# Patient Record
Sex: MALE | Race: WHITE | NOT HISPANIC OR LATINO | Employment: FULL TIME | URBAN - METROPOLITAN AREA
[De-identification: names, ages, dates, MRNs, and addresses within clinical notes are randomized per-mention and may not be internally consistent; named-entity substitution may affect disease eponyms.]

---

## 2025-01-10 ENCOUNTER — APPOINTMENT (OUTPATIENT)
Dept: RADIOLOGY | Facility: HOSPITAL | Age: 25
End: 2025-01-10
Payer: COMMERCIAL

## 2025-01-10 ENCOUNTER — APPOINTMENT (OUTPATIENT)
Dept: CARDIOLOGY | Facility: HOSPITAL | Age: 25
End: 2025-01-10
Payer: COMMERCIAL

## 2025-01-10 ENCOUNTER — HOSPITAL ENCOUNTER (EMERGENCY)
Facility: HOSPITAL | Age: 25
Discharge: HOME | End: 2025-01-10
Attending: EMERGENCY MEDICINE
Payer: COMMERCIAL

## 2025-01-10 VITALS
WEIGHT: 190 LBS | OXYGEN SATURATION: 99 % | RESPIRATION RATE: 18 BRPM | DIASTOLIC BLOOD PRESSURE: 83 MMHG | BODY MASS INDEX: 26.6 KG/M2 | HEIGHT: 71 IN | SYSTOLIC BLOOD PRESSURE: 129 MMHG | TEMPERATURE: 97.7 F | HEART RATE: 95 BPM

## 2025-01-10 DIAGNOSIS — R17 ELEVATED BILIRUBIN: Primary | ICD-10-CM

## 2025-01-10 DIAGNOSIS — R07.9 CHEST PAIN, UNSPECIFIED TYPE: ICD-10-CM

## 2025-01-10 DIAGNOSIS — R10.11 ACUTE BILATERAL UPPER ABDOMINAL PAIN: ICD-10-CM

## 2025-01-10 DIAGNOSIS — R10.12 ACUTE BILATERAL UPPER ABDOMINAL PAIN: ICD-10-CM

## 2025-01-10 LAB
ALBUMIN SERPL BCP-MCNC: 5.1 G/DL (ref 3.4–5)
ALP SERPL-CCNC: 53 U/L (ref 33–120)
ALT SERPL W P-5'-P-CCNC: 15 U/L (ref 10–52)
ANION GAP SERPL CALC-SCNC: 16 MMOL/L (ref 10–20)
AST SERPL W P-5'-P-CCNC: 23 U/L (ref 9–39)
BASOPHILS # BLD AUTO: 0.03 X10*3/UL (ref 0–0.1)
BASOPHILS NFR BLD AUTO: 0.3 %
BILIRUB SERPL-MCNC: 3.2 MG/DL (ref 0–1.2)
BUN SERPL-MCNC: 11 MG/DL (ref 6–23)
CALCIUM SERPL-MCNC: 10 MG/DL (ref 8.6–10.3)
CARDIAC TROPONIN I PNL SERPL HS: <3 NG/L (ref 0–20)
CHLORIDE SERPL-SCNC: 100 MMOL/L (ref 98–107)
CO2 SERPL-SCNC: 27 MMOL/L (ref 21–32)
CREAT SERPL-MCNC: 0.87 MG/DL (ref 0.5–1.3)
EGFRCR SERPLBLD CKD-EPI 2021: >90 ML/MIN/1.73M*2
EOSINOPHIL # BLD AUTO: 0.09 X10*3/UL (ref 0–0.7)
EOSINOPHIL NFR BLD AUTO: 1 %
ERYTHROCYTE [DISTWIDTH] IN BLOOD BY AUTOMATED COUNT: 12 % (ref 11.5–14.5)
GLUCOSE SERPL-MCNC: 81 MG/DL (ref 74–99)
HCT VFR BLD AUTO: 45.1 % (ref 41–52)
HGB BLD-MCNC: 16.1 G/DL (ref 13.5–17.5)
IMM GRANULOCYTES # BLD AUTO: 0.02 X10*3/UL (ref 0–0.7)
IMM GRANULOCYTES NFR BLD AUTO: 0.2 % (ref 0–0.9)
LIPASE SERPL-CCNC: 15 U/L (ref 9–82)
LYMPHOCYTES # BLD AUTO: 1.99 X10*3/UL (ref 1.2–4.8)
LYMPHOCYTES NFR BLD AUTO: 23 %
MCH RBC QN AUTO: 31.6 PG (ref 26–34)
MCHC RBC AUTO-ENTMCNC: 35.7 G/DL (ref 32–36)
MCV RBC AUTO: 88 FL (ref 80–100)
MONOCYTES # BLD AUTO: 0.78 X10*3/UL (ref 0.1–1)
MONOCYTES NFR BLD AUTO: 9 %
NEUTROPHILS # BLD AUTO: 5.74 X10*3/UL (ref 1.2–7.7)
NEUTROPHILS NFR BLD AUTO: 66.5 %
NRBC BLD-RTO: 0 /100 WBCS (ref 0–0)
PLATELET # BLD AUTO: 304 X10*3/UL (ref 150–450)
POTASSIUM SERPL-SCNC: 3.3 MMOL/L (ref 3.5–5.3)
PROT SERPL-MCNC: 7.9 G/DL (ref 6.4–8.2)
RBC # BLD AUTO: 5.1 X10*6/UL (ref 4.5–5.9)
SODIUM SERPL-SCNC: 140 MMOL/L (ref 136–145)
WBC # BLD AUTO: 8.7 X10*3/UL (ref 4.4–11.3)

## 2025-01-10 PROCEDURE — 36415 COLL VENOUS BLD VENIPUNCTURE: CPT | Performed by: NURSE PRACTITIONER

## 2025-01-10 PROCEDURE — 71046 X-RAY EXAM CHEST 2 VIEWS: CPT

## 2025-01-10 PROCEDURE — 80053 COMPREHEN METABOLIC PANEL: CPT | Performed by: NURSE PRACTITIONER

## 2025-01-10 PROCEDURE — 71046 X-RAY EXAM CHEST 2 VIEWS: CPT | Performed by: SURGERY

## 2025-01-10 PROCEDURE — 99285 EMERGENCY DEPT VISIT HI MDM: CPT | Mod: 25 | Performed by: EMERGENCY MEDICINE

## 2025-01-10 PROCEDURE — 76705 ECHO EXAM OF ABDOMEN: CPT | Performed by: SURGERY

## 2025-01-10 PROCEDURE — 2500000002 HC RX 250 W HCPCS SELF ADMINISTERED DRUGS (ALT 637 FOR MEDICARE OP, ALT 636 FOR OP/ED)

## 2025-01-10 PROCEDURE — 76705 ECHO EXAM OF ABDOMEN: CPT

## 2025-01-10 PROCEDURE — 85025 COMPLETE CBC W/AUTO DIFF WBC: CPT | Performed by: NURSE PRACTITIONER

## 2025-01-10 PROCEDURE — 93005 ELECTROCARDIOGRAM TRACING: CPT

## 2025-01-10 PROCEDURE — 83690 ASSAY OF LIPASE: CPT | Performed by: NURSE PRACTITIONER

## 2025-01-10 PROCEDURE — 84484 ASSAY OF TROPONIN QUANT: CPT | Performed by: NURSE PRACTITIONER

## 2025-01-10 RX ORDER — POTASSIUM CHLORIDE 20 MEQ/1
40 TABLET, EXTENDED RELEASE ORAL ONCE
Status: COMPLETED | OUTPATIENT
Start: 2025-01-10 | End: 2025-01-10

## 2025-01-10 RX ADMIN — POTASSIUM CHLORIDE 40 MEQ: 1500 TABLET, EXTENDED RELEASE ORAL at 20:49

## 2025-01-10 ASSESSMENT — PAIN DESCRIPTION - LOCATION: LOCATION: CHEST

## 2025-01-10 ASSESSMENT — COLUMBIA-SUICIDE SEVERITY RATING SCALE - C-SSRS
1. IN THE PAST MONTH, HAVE YOU WISHED YOU WERE DEAD OR WISHED YOU COULD GO TO SLEEP AND NOT WAKE UP?: NO
6. HAVE YOU EVER DONE ANYTHING, STARTED TO DO ANYTHING, OR PREPARED TO DO ANYTHING TO END YOUR LIFE?: NO
2. HAVE YOU ACTUALLY HAD ANY THOUGHTS OF KILLING YOURSELF?: NO

## 2025-01-10 ASSESSMENT — LIFESTYLE VARIABLES
HAVE PEOPLE ANNOYED YOU BY CRITICIZING YOUR DRINKING: NO
EVER HAD A DRINK FIRST THING IN THE MORNING TO STEADY YOUR NERVES TO GET RID OF A HANGOVER: NO
TOTAL SCORE: 0
HAVE YOU EVER FELT YOU SHOULD CUT DOWN ON YOUR DRINKING: NO
EVER FELT BAD OR GUILTY ABOUT YOUR DRINKING: NO

## 2025-01-10 ASSESSMENT — PAIN DESCRIPTION - ORIENTATION: ORIENTATION: LEFT

## 2025-01-10 ASSESSMENT — PAIN DESCRIPTION - PAIN TYPE: TYPE: ACUTE PAIN

## 2025-01-10 ASSESSMENT — PAIN - FUNCTIONAL ASSESSMENT: PAIN_FUNCTIONAL_ASSESSMENT: 0-10

## 2025-01-10 ASSESSMENT — PAIN SCALES - GENERAL: PAINLEVEL_OUTOF10: 4

## 2025-01-10 NOTE — ED TRIAGE NOTES
TRIAGE NOTE   I saw the patient as the Clinician in Triage and performed a brief history and physical exam, established acuity, and ordered appropriate tests to develop basic plan of care. Patient will be seen by an [facility], resident and/or physician who will independently evaluate the patient. Please see subsequent provider notes for further details and disposition.     Brief HPI: In brief, Jonnie Jensen is a 24 y.o. male significant PMH for A-fib s/p ablation presenting to ED today from home by himself for evaluation of chest pain and abdominal pain.  A week ago the patient developed intermittent left-sided anterior chest pain as well as intermittent abdominal pain.  Currently no pain in the chest or abdomen.  No contributing or relieving factors.  Pain seems to be random.  Patient is concerned his eyes were yellow and there could be something wrong with his liver.  No history of PE/DVT, recent travel, recent surgery, history of malignancy or use of exogenous hormones.  Father had MI in his 50s.  Denies fever/chills, cough/cold symptoms, shortness of breath, nausea/vomiting, dysuria/hematuria, change in bowel habits or any other complaints.  Smokes marijuana, no EtOH or IV drugs.  PCP is Dr. Amador, cardiologist at [facility].    Focused Physical exam:   General: 24-year-old male, awake and alert, oriented x 3.  Well-nourished and hydrated.  Nontoxic looking.  Skin: Pink, warm dry  Cardiac: Regular rate and rhythm  Pulmonary: Clear bilaterally.  Abdomen: Flat and soft with bowel sounds, nontender.  No CVA tenderness.    Plan/MDM:   24-year-old male with history of A-fib s/p ablation is evaluated at the bedside for intermittent left-sided chest pain and abdominal pain over the last week.  Currently no pain.  Vital signs within normal limits.  Afebrile.  Percs out for PE.  IV established, will check basic labs including troponin, EKG and chest x-ray.  As the abdomen is not tender at this time, will review lab work  before ordering imaging of the abdomen.  Patient is agreeable to this plan.      Please see subsequent provider note for further details and disposition

## 2025-01-10 NOTE — ED TRIAGE NOTES
Pt states that he has been having intermittent chest pain on the left side of his chest for the last week. Has hx of afib. States that he has also been having abdominal pain for the last week as well. States that for the abdominal pain that it is middle of his abdomen on both sides. States that is like a 3 or 4 for pain. Having normal BM. Is nauseous in the mornings.

## 2025-01-11 NOTE — ED PROVIDER NOTES
HPI   Chief Complaint   Patient presents with    Abdominal Pain    Chest Pain     Pt states that he has been having intermittent chest pain on the left side of his chest for the last week. Has hx of afib. States that he has also been having abdominal pain for the last week as well. States that for the abdominal pain that it is middle of his abdomen on both sides. States that is like a 3 or 4 for pain. Having normal BM. Is nauseous in the mornings.          24 year-old male significant PMH for A-fib s/p ablation presenting to ED today for evaluation of chest pain and abdominal pain x 1 week.  Patient states he has had intermittent left-sided chest pain, describes it as sharp shooting lasting up to 2-3 minutes.  Denies the pain radiating anywhere, any worsening, or alleviating factors.  Patiently additionally reports intermittent upper abdominal pain.  States he noticed his eyes have been slightly yellow over the past week as well. Endorses nausea present in the mornings but denies any vomiting.  Denies any fevers, chills, HA, vision changes, cough/cold symptoms, SOB, palpitations, diarrhea, constipation, urinary symptoms.  States he takes a daily baby aspirin.  Reports using marijuana.  Denies cigarette smoking, vapes, alcohol, IV drug use.  Reports a prior EGD, denies any other prior abdominal surgeries.               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  No family history on file.  Social History     Tobacco Use    Smoking status: Not on file    Smokeless tobacco: Not on file   Substance Use Topics    Alcohol use: Not on file    Drug use: Not on file       Physical Exam   ED Triage Vitals   Temperature  Heart Rate Respirations BP   01/10/25 1839 01/10/25 1839 01/10/25 1839 01/10/25 1839   36.5 °C (97.7 °F) 71 16 130/72      Pulse Ox Temp Source Heart Rate Source Patient Position   01/10/25 1839 01/10/25 1839 01/10/25 1839 01/10/25 2014   100 % Tympanic Monitor Sitting      BP Location FiO2 (%)     01/10/25 2014 --     Right arm        Physical Exam  Constitutional:       General: He is not in acute distress.     Appearance: He is well-developed. He is not ill-appearing or toxic-appearing.   HENT:      Head: Normocephalic and atraumatic.   Eyes:      General: Scleral icterus present.      Extraocular Movements: Extraocular movements intact.      Pupils: Pupils are equal, round, and reactive to light.   Cardiovascular:      Rate and Rhythm: Normal rate and regular rhythm.      Heart sounds: Normal heart sounds.   Pulmonary:      Effort: Pulmonary effort is normal. No respiratory distress.      Breath sounds: Normal breath sounds.   Chest:      Chest wall: No tenderness.   Abdominal:      General: Abdomen is flat and protuberant. Bowel sounds are normal. There is no distension. There are no signs of injury.      Palpations: Abdomen is soft. There is no hepatomegaly or splenomegaly.      Tenderness: There is no abdominal tenderness. There is no right CVA tenderness, left CVA tenderness, guarding or rebound.   Skin:     General: Skin is warm and dry.   Neurological:      General: No focal deficit present.      Mental Status: He is alert.           ED Course & MDM   Diagnoses as of 01/10/25 2220   Chest pain, unspecified type   Acute bilateral upper abdominal pain   Elevated bilirubin                 No data recorded     [place] Coma Scale Score: 15 (01/10/25 2018 : Kayley Davis, TERESA)                           Medical Decision Making  24 year-old male significant PMH for A-fib s/p ablation presenting to ED today for evaluation of chest pain and abdominal pain x 1 week.  On arrival, patient is afebrile, without  tachycardia, SpO2 99% on RA.  Initial workup ordered in triage.  On exam, patient states he is currently asymptomatic.  Reports an episode of chest pain while in the waiting room that is since resolved.  No reproducible chest tenderness or abdominal tenderness on palpation.  Slight scleral icterus present.  EKG showing normal sinus rhythm, specific T wave changes similar to prior study, rate 85, , , QTc 449.  No acute axis deviation, STEMI, or ischemia.  High sensitivity troponin negative.  CBC unremarkable, no leukocytosis or signs of anemia.  CMP showing slight hypokalemia 3.3 and elevated bilirubin of 3.2.  A RUQ US was added to the initial workup.  Lipase WNL. CXR negative. Potassium was replaced. RUQ US showing 2 incidental small hyperechoic hepatic masses that are likely incidental benign cavernous hemangiomata.  No obstructive processes identified. Patient was informed of all his lab and imaging findings, all questions and concerns were answered.  Patient was given a GI referral to follow-up regarding the elevated bilirubin and incidental findings on the ultrasound.  Also discussed decreasing marijuana use to assess for improvement in persistent nausea. Patient was agreeable to plan of care and discharge with GI follow-up. Dr. Wang, a gastroenterologist, contact information was provided on discharge papers as well as all his lab findings. Strict return precautions were given to return to the ED with any new or worsening symptoms.        Procedure  Procedures     Nathalie Barrera PA-C  01/10/25 2222       Nathalie Barrera PA-C  01/10/25 222

## 2025-01-11 NOTE — DISCHARGE INSTRUCTIONS
Dr. Wang's contact information was provided for a GI follow-up if you cannot get in with your establish GI provider. Return to the ED with any new or worsening symptoms.

## 2025-01-13 LAB
ATRIAL RATE: 85 BPM
P AXIS: 88 DEGREES
P OFFSET: 204 MS
P ONSET: 145 MS
PR INTERVAL: 152 MS
Q ONSET: 221 MS
QRS COUNT: 14 BEATS
QRS DURATION: 104 MS
QT INTERVAL: 378 MS
QTC CALCULATION(BAZETT): 449 MS
QTC FREDERICIA: 424 MS
R AXIS: 87 DEGREES
T AXIS: 70 DEGREES
T OFFSET: 410 MS
VENTRICULAR RATE: 85 BPM

## 2025-02-18 ENCOUNTER — APPOINTMENT (OUTPATIENT)
Dept: GASTROENTEROLOGY | Facility: CLINIC | Age: 25
End: 2025-02-18
Payer: COMMERCIAL

## 2025-02-18 VITALS
BODY MASS INDEX: 26.18 KG/M2 | WEIGHT: 187 LBS | SYSTOLIC BLOOD PRESSURE: 110 MMHG | HEIGHT: 71 IN | HEART RATE: 91 BPM | DIASTOLIC BLOOD PRESSURE: 61 MMHG

## 2025-02-18 DIAGNOSIS — E80.6 BILIRUBINEMIA: ICD-10-CM

## 2025-02-18 DIAGNOSIS — R10.13 EPIGASTRIC PAIN SYNDROME: Primary | ICD-10-CM

## 2025-02-18 PROCEDURE — 3008F BODY MASS INDEX DOCD: CPT | Performed by: INTERNAL MEDICINE

## 2025-02-18 PROCEDURE — 99203 OFFICE O/P NEW LOW 30 MIN: CPT | Performed by: INTERNAL MEDICINE

## 2025-02-18 RX ORDER — PROPAFENONE HYDROCHLORIDE 225 MG/1
225 CAPSULE, EXTENDED RELEASE ORAL 2 TIMES DAILY
COMMUNITY

## 2025-02-18 RX ORDER — METOPROLOL TARTRATE 25 MG/1
12.5 TABLET, FILM COATED ORAL 2 TIMES DAILY
COMMUNITY

## 2025-02-18 NOTE — PROGRESS NOTES
This 24-year-old gentleman was in the emergency room because of abdominal pain.  It was mostly confined to the upper abdomen.  There was no fever or chills the patient did notice that his skin color was slightly yellow which she has noticed for the last few months.  An ultrasound of the abdomen was obtained which showed to hepatic cysts which are thought to be hemangiomas no evidence of gallstone biliary dilatation no parenchymal liver disease was noted.  He was sent here for an evaluation.    The patient was found to have atrial fibrillation about 3 years ago he required ablation therapy.  He took anticoagulation for a brief period of time currently he is not taking any anticoagulation  He was found to be in normal sinus rhythm on EKG at the emergency room.    Past medical and surgical history apart from April atrial fibrillation he has been in good health.    Family history his father has fatty liver mother apparently is in good health.  One of his uncles had hepatitis.    Personal history he smokes marijuana on a daily basis.  Does not consume alcoholic beverages or any other chemicals.    A 10 point review of system is obtained which is positive for history of atrial fibrillation ablation therapy no prior history of heart disease murmurs valvular heart disease alcohol abuse thyroid disease appetite and weight is good otherwise he has no desire local out of his eyes but not of the urine.  No urinary symptoms otherwise negative.    On physical examination healthy-appearing gentleman who appears slightly icteric HEENT is otherwise unremarkable oral cavity well-hydrated neck is supple no adenopathy thyroid is not enlarged.  JVD is flat.    Heart sounds were normal careful examination does not reveal any evidence of cardiomegaly or murmurs.  Appears to be regular sinus rhythm.  Chest is clear to auscultation and percussion.    Examination abdomen is completely negative no hepatomegaly splenomegaly ascites signs of  collateral circulation free fluid in the abdomen hernias or masses no peripheral edema mental status neuro normal.    General Examination reveals a healthy-appearing gentleman who is slightly icteric without cyanosis clubbing jaundice lymphadenopathy and clinically euthyroid.  Clinical impression this patient has asymptomatic hyperbilirubinemia which most likely is in the absence of any liver disease or hemolysis Gilbert's syndrome.  We will request direct and indirect bilirubin as well as a reticulocyte count and LDH.  He would also require an upper GI endoscopic examination to evaluate his abdominal pain.    The    The patient has history of atrial fibrillation for which she had ablation therapy and currently in sinus rhythm he is not take any systemic anticoagulation etiology of his atrial fibrillation remains unclear me he does not appear to have any valvular heart disease or congenital heart disease his echocardiogram was normal.

## 2025-03-05 ENCOUNTER — ANESTHESIA (OUTPATIENT)
Dept: GASTROENTEROLOGY | Facility: HOSPITAL | Age: 25
End: 2025-03-05
Payer: COMMERCIAL

## 2025-03-05 ENCOUNTER — HOSPITAL ENCOUNTER (OUTPATIENT)
Dept: GASTROENTEROLOGY | Facility: HOSPITAL | Age: 25
Discharge: HOME | End: 2025-03-05
Payer: COMMERCIAL

## 2025-03-05 ENCOUNTER — ANESTHESIA EVENT (OUTPATIENT)
Dept: GASTROENTEROLOGY | Facility: HOSPITAL | Age: 25
End: 2025-03-05
Payer: COMMERCIAL

## 2025-03-05 VITALS
BODY MASS INDEX: 26.18 KG/M2 | DIASTOLIC BLOOD PRESSURE: 70 MMHG | HEIGHT: 71 IN | WEIGHT: 187 LBS | RESPIRATION RATE: 16 BRPM | TEMPERATURE: 96.8 F | SYSTOLIC BLOOD PRESSURE: 126 MMHG | OXYGEN SATURATION: 100 % | HEART RATE: 52 BPM

## 2025-03-05 DIAGNOSIS — R10.13 EPIGASTRIC PAIN SYNDROME: ICD-10-CM

## 2025-03-05 PROCEDURE — A43239 PR EDG TRANSORAL BIOPSY SINGLE/MULTIPLE: Performed by: NURSE ANESTHETIST, CERTIFIED REGISTERED

## 2025-03-05 PROCEDURE — 3700000002 HC GENERAL ANESTHESIA TIME - EACH INCREMENTAL 1 MINUTE

## 2025-03-05 PROCEDURE — 3700000001 HC GENERAL ANESTHESIA TIME - INITIAL BASE CHARGE

## 2025-03-05 PROCEDURE — 43239 EGD BIOPSY SINGLE/MULTIPLE: CPT | Performed by: INTERNAL MEDICINE

## 2025-03-05 PROCEDURE — A43239 PR EDG TRANSORAL BIOPSY SINGLE/MULTIPLE: Performed by: ANESTHESIOLOGY

## 2025-03-05 PROCEDURE — 7100000010 HC PHASE TWO TIME - EACH INCREMENTAL 1 MINUTE

## 2025-03-05 PROCEDURE — 2500000004 HC RX 250 GENERAL PHARMACY W/ HCPCS (ALT 636 FOR OP/ED): Performed by: NURSE ANESTHETIST, CERTIFIED REGISTERED

## 2025-03-05 PROCEDURE — 7100000009 HC PHASE TWO TIME - INITIAL BASE CHARGE

## 2025-03-05 RX ORDER — LIDOCAINE HCL/PF 100 MG/5ML
SYRINGE (ML) INTRAVENOUS AS NEEDED
Status: DISCONTINUED | OUTPATIENT
Start: 2025-03-05 | End: 2025-03-05

## 2025-03-05 RX ORDER — PROPOFOL 10 MG/ML
INJECTION, EMULSION INTRAVENOUS AS NEEDED
Status: DISCONTINUED | OUTPATIENT
Start: 2025-03-05 | End: 2025-03-05

## 2025-03-05 RX ADMIN — PROPOFOL 100 MG: 10 INJECTION, EMULSION INTRAVENOUS at 11:29

## 2025-03-05 RX ADMIN — PROPOFOL 140 MCG/KG/MIN: 10 INJECTION, EMULSION INTRAVENOUS at 11:30

## 2025-03-05 RX ADMIN — LIDOCAINE HYDROCHLORIDE 80 MG: 20 INJECTION, SOLUTION INTRAVENOUS at 11:29

## 2025-03-05 SDOH — HEALTH STABILITY: MENTAL HEALTH: CURRENT SMOKER: 0

## 2025-03-05 ASSESSMENT — PAIN SCALES - GENERAL
PAINLEVEL_OUTOF10: 0 - NO PAIN
PAIN_LEVEL: 0

## 2025-03-05 ASSESSMENT — COLUMBIA-SUICIDE SEVERITY RATING SCALE - C-SSRS
2. HAVE YOU ACTUALLY HAD ANY THOUGHTS OF KILLING YOURSELF?: NO
6. HAVE YOU EVER DONE ANYTHING, STARTED TO DO ANYTHING, OR PREPARED TO DO ANYTHING TO END YOUR LIFE?: NO
1. IN THE PAST MONTH, HAVE YOU WISHED YOU WERE DEAD OR WISHED YOU COULD GO TO SLEEP AND NOT WAKE UP?: NO

## 2025-03-05 ASSESSMENT — PAIN - FUNCTIONAL ASSESSMENT
PAIN_FUNCTIONAL_ASSESSMENT: 0-10

## 2025-03-05 NOTE — ANESTHESIA PREPROCEDURE EVALUATION
Patient: Jonnie Jensen    Procedure Information       Date/Time: 03/05/25 1200    Scheduled providers: Mariam Sinclair MD; Joe Berman MD    Procedure: EGD    Location: [facility]            Relevant Problems   Anesthesia   (-) Difficult intubation   (-) PONV (postoperative nausea and vomiting)       Clinical information reviewed:    Allergies  Meds               NPO Detail:  NPO/Void Status  Date of Last Liquid: 03/05/25  Time of Last Liquid: 0000  Date of Last Solid: 03/05/25  Time of Last Solid: 0000         Physical Exam    Airway  Mallampati: I  TM distance: >3 FB  Neck ROM: full     Cardiovascular - normal exam  Rhythm: regular  Rate: normal     Dental    Pulmonary - normal exam     Abdominal            Anesthesia Plan    History of general anesthesia?: yes  History of complications of general anesthesia?: no    ASA 1     MAC     The patient is not a current smoker.  Education provided regarding risk of obstructive sleep apnea.

## 2025-03-05 NOTE — ANESTHESIA POSTPROCEDURE EVALUATION
Patient: Jonnie Jensen    Procedure Summary       Date: 03/05/25 Room / Location: [facility]    Anesthesia Start: 1125 Anesthesia Stop:     Procedure: EGD Diagnosis: Epigastric pain syndrome    Scheduled Providers: Mariam Sinclair MD; Joe Berman MD Responsible Provider: Joe Berman MD    Anesthesia Type: MAC ASA Status: 1            Anesthesia Type: MAC    Vitals Value Taken Time   /56 03/05/25 1143   Temp 36 °C (96.8 °F) 03/05/25 1143   Pulse 52 03/05/25 1143   Resp 16 03/05/25 1143   SpO2 97 % 03/05/25 1143       Anesthesia Post Evaluation    Patient location during evaluation: PACU  Patient participation: complete - patient participated  Level of consciousness: sleepy but conscious  Pain score: 0  Pain management: adequate  Airway patency: patent  Cardiovascular status: acceptable, blood pressure returned to baseline and hemodynamically stable  Respiratory status: acceptable  Hydration status: acceptable  Postoperative Nausea and Vomiting: none        There were no known notable events for this encounter.

## 2025-03-13 LAB
BILIRUB DIRECT SERPL-MCNC: 0.4 MG/DL
BILIRUB SERPL-MCNC: 2.5 MG/DL (ref 0.2–1.2)
LABORATORY COMMENT REPORT: NORMAL
PATH REPORT.FINAL DX SPEC: NORMAL
PATH REPORT.GROSS SPEC: NORMAL
PATH REPORT.RELEVANT HX SPEC: NORMAL
PATH REPORT.TOTAL CANCER: NORMAL
RETICS #: NORMAL CELLS/UL (ref 25000–90000)
RETICS/RBC NFR AUTO: 1.2 %

## 2025-03-15 ENCOUNTER — HOSPITAL ENCOUNTER (OUTPATIENT)
Dept: CARDIOLOGY | Facility: HOSPITAL | Age: 25
Discharge: HOME | End: 2025-03-15
Payer: COMMERCIAL

## 2025-03-15 ENCOUNTER — APPOINTMENT (OUTPATIENT)
Dept: RADIOLOGY | Facility: HOSPITAL | Age: 25
End: 2025-03-15
Payer: COMMERCIAL

## 2025-03-15 ENCOUNTER — HOSPITAL ENCOUNTER (EMERGENCY)
Facility: HOSPITAL | Age: 25
Discharge: HOME | End: 2025-03-15
Attending: STUDENT IN AN ORGANIZED HEALTH CARE EDUCATION/TRAINING PROGRAM
Payer: COMMERCIAL

## 2025-03-15 ENCOUNTER — APPOINTMENT (OUTPATIENT)
Dept: CARDIOLOGY | Facility: HOSPITAL | Age: 25
End: 2025-03-15
Payer: COMMERCIAL

## 2025-03-15 VITALS
HEART RATE: 60 BPM | HEIGHT: 71 IN | SYSTOLIC BLOOD PRESSURE: 118 MMHG | TEMPERATURE: 97.3 F | DIASTOLIC BLOOD PRESSURE: 67 MMHG | WEIGHT: 175 LBS | OXYGEN SATURATION: 95 % | BODY MASS INDEX: 24.5 KG/M2 | RESPIRATION RATE: 18 BRPM

## 2025-03-15 DIAGNOSIS — F12.90: ICD-10-CM

## 2025-03-15 DIAGNOSIS — R55 SYNCOPE AND COLLAPSE: Primary | ICD-10-CM

## 2025-03-15 DIAGNOSIS — R11.2 NAUSEA AND VOMITING, UNSPECIFIED VOMITING TYPE: ICD-10-CM

## 2025-03-15 LAB
ALBUMIN SERPL BCP-MCNC: 4.6 G/DL (ref 3.4–5)
ALP SERPL-CCNC: 46 U/L (ref 33–120)
ALT SERPL W P-5'-P-CCNC: 9 U/L (ref 10–52)
ANION GAP SERPL CALC-SCNC: 14 MMOL/L (ref 10–20)
AST SERPL W P-5'-P-CCNC: 14 U/L (ref 9–39)
BASOPHILS # BLD AUTO: 0.03 X10*3/UL (ref 0–0.1)
BASOPHILS NFR BLD AUTO: 0.4 %
BILIRUB SERPL-MCNC: 2.8 MG/DL (ref 0–1.2)
BUN SERPL-MCNC: 13 MG/DL (ref 6–23)
CALCIUM SERPL-MCNC: 9.5 MG/DL (ref 8.6–10.3)
CARDIAC TROPONIN I PNL SERPL HS: <3 NG/L (ref 0–20)
CARDIAC TROPONIN I PNL SERPL HS: <3 NG/L (ref 0–20)
CHLORIDE SERPL-SCNC: 104 MMOL/L (ref 98–107)
CO2 SERPL-SCNC: 25 MMOL/L (ref 21–32)
CREAT SERPL-MCNC: 0.87 MG/DL (ref 0.5–1.3)
D DIMER PPP FEU-MCNC: 638 NG/ML FEU
EGFRCR SERPLBLD CKD-EPI 2021: >90 ML/MIN/1.73M*2
EOSINOPHIL # BLD AUTO: 0.17 X10*3/UL (ref 0–0.7)
EOSINOPHIL NFR BLD AUTO: 2.2 %
ERYTHROCYTE [DISTWIDTH] IN BLOOD BY AUTOMATED COUNT: 12 % (ref 11.5–14.5)
GLUCOSE BLD MANUAL STRIP-MCNC: 110 MG/DL (ref 74–99)
GLUCOSE SERPL-MCNC: 102 MG/DL (ref 74–99)
HCT VFR BLD AUTO: 44.5 % (ref 41–52)
HGB BLD-MCNC: 15.6 G/DL (ref 13.5–17.5)
IMM GRANULOCYTES # BLD AUTO: 0.03 X10*3/UL (ref 0–0.7)
IMM GRANULOCYTES NFR BLD AUTO: 0.4 % (ref 0–0.9)
LYMPHOCYTES # BLD AUTO: 1.96 X10*3/UL (ref 1.2–4.8)
LYMPHOCYTES NFR BLD AUTO: 25.9 %
MAGNESIUM SERPL-MCNC: 1.82 MG/DL (ref 1.6–2.4)
MCH RBC QN AUTO: 30.6 PG (ref 26–34)
MCHC RBC AUTO-ENTMCNC: 35.1 G/DL (ref 32–36)
MCV RBC AUTO: 87 FL (ref 80–100)
MONOCYTES # BLD AUTO: 0.75 X10*3/UL (ref 0.1–1)
MONOCYTES NFR BLD AUTO: 9.9 %
NEUTROPHILS # BLD AUTO: 4.62 X10*3/UL (ref 1.2–7.7)
NEUTROPHILS NFR BLD AUTO: 61.2 %
NRBC BLD-RTO: 0 /100 WBCS (ref 0–0)
PLATELET # BLD AUTO: 287 X10*3/UL (ref 150–450)
POTASSIUM SERPL-SCNC: 3.7 MMOL/L (ref 3.5–5.3)
PROT SERPL-MCNC: 7.2 G/DL (ref 6.4–8.2)
RBC # BLD AUTO: 5.09 X10*6/UL (ref 4.5–5.9)
SODIUM SERPL-SCNC: 139 MMOL/L (ref 136–145)
WBC # BLD AUTO: 7.6 X10*3/UL (ref 4.4–11.3)

## 2025-03-15 PROCEDURE — 85025 COMPLETE CBC W/AUTO DIFF WBC: CPT | Performed by: STUDENT IN AN ORGANIZED HEALTH CARE EDUCATION/TRAINING PROGRAM

## 2025-03-15 PROCEDURE — 84484 ASSAY OF TROPONIN QUANT: CPT | Performed by: STUDENT IN AN ORGANIZED HEALTH CARE EDUCATION/TRAINING PROGRAM

## 2025-03-15 PROCEDURE — 36415 COLL VENOUS BLD VENIPUNCTURE: CPT | Performed by: STUDENT IN AN ORGANIZED HEALTH CARE EDUCATION/TRAINING PROGRAM

## 2025-03-15 PROCEDURE — 93005 ELECTROCARDIOGRAM TRACING: CPT

## 2025-03-15 PROCEDURE — 85379 FIBRIN DEGRADATION QUANT: CPT | Performed by: STUDENT IN AN ORGANIZED HEALTH CARE EDUCATION/TRAINING PROGRAM

## 2025-03-15 PROCEDURE — 96361 HYDRATE IV INFUSION ADD-ON: CPT

## 2025-03-15 PROCEDURE — 71045 X-RAY EXAM CHEST 1 VIEW: CPT | Performed by: RADIOLOGY

## 2025-03-15 PROCEDURE — 71045 X-RAY EXAM CHEST 1 VIEW: CPT

## 2025-03-15 PROCEDURE — 83735 ASSAY OF MAGNESIUM: CPT | Performed by: STUDENT IN AN ORGANIZED HEALTH CARE EDUCATION/TRAINING PROGRAM

## 2025-03-15 PROCEDURE — 96374 THER/PROPH/DIAG INJ IV PUSH: CPT | Mod: 59

## 2025-03-15 PROCEDURE — 71275 CT ANGIOGRAPHY CHEST: CPT

## 2025-03-15 PROCEDURE — 71275 CT ANGIOGRAPHY CHEST: CPT | Performed by: RADIOLOGY

## 2025-03-15 PROCEDURE — 99285 EMERGENCY DEPT VISIT HI MDM: CPT | Mod: 25 | Performed by: STUDENT IN AN ORGANIZED HEALTH CARE EDUCATION/TRAINING PROGRAM

## 2025-03-15 PROCEDURE — 2550000001 HC RX 255 CONTRASTS: Performed by: STUDENT IN AN ORGANIZED HEALTH CARE EDUCATION/TRAINING PROGRAM

## 2025-03-15 PROCEDURE — 82947 ASSAY GLUCOSE BLOOD QUANT: CPT | Mod: 59

## 2025-03-15 PROCEDURE — 2500000004 HC RX 250 GENERAL PHARMACY W/ HCPCS (ALT 636 FOR OP/ED): Performed by: STUDENT IN AN ORGANIZED HEALTH CARE EDUCATION/TRAINING PROGRAM

## 2025-03-15 PROCEDURE — 80053 COMPREHEN METABOLIC PANEL: CPT | Performed by: STUDENT IN AN ORGANIZED HEALTH CARE EDUCATION/TRAINING PROGRAM

## 2025-03-15 RX ORDER — ONDANSETRON 4 MG/1
4 TABLET, ORALLY DISINTEGRATING ORAL EVERY 8 HOURS PRN
Qty: 20 TABLET | Refills: 0 | Status: SHIPPED | OUTPATIENT
Start: 2025-03-15 | End: 2025-03-22

## 2025-03-15 RX ORDER — ONDANSETRON HYDROCHLORIDE 2 MG/ML
4 INJECTION, SOLUTION INTRAVENOUS ONCE
Status: COMPLETED | OUTPATIENT
Start: 2025-03-15 | End: 2025-03-15

## 2025-03-15 RX ADMIN — ONDANSETRON 4 MG: 2 INJECTION INTRAMUSCULAR; INTRAVENOUS at 16:34

## 2025-03-15 RX ADMIN — IOHEXOL 80 ML: 350 INJECTION, SOLUTION INTRAVENOUS at 17:30

## 2025-03-15 RX ADMIN — SODIUM CHLORIDE 1000 ML: 9 INJECTION, SOLUTION INTRAVENOUS at 16:34

## 2025-03-15 NOTE — ED TRIAGE NOTES
Pt BIBA after having a syncopal episode. States that he was sweating, nauseous throwing up and feeling dizzy. Also complaining of lightheaded sensation. Did smoke marijuana. Is nauseous in triage.

## 2025-03-15 NOTE — ED PROVIDER NOTES
HPI   Chief Complaint   Patient presents with    Syncope     Pt BIBA after having a syncopal episode. States that he was sweating, nauseous throwing up and feeling dizzy. Also complaining of lightheaded sensation. Did smoke marijuana. Is nauseous in triage.          24-year-old male with past medical history of A-fib came in via EMS due to syncope episode while at home.  After the syncope episode he started spearing seeing nausea, vomiting, lightheadedness and sweating.  Patient uses marijuana but denies any similar symptoms in the past related to the substance use.  Patient denies any fever, chest pain, palpitations, diarrhea, sick contacts, recent traveling, leg swelling/pain, recent immobilization/surgeries, cough, congestion, DVT/PE history but there is family history of PE on his father.  Patient denies any head trauma because he is father was near him so he caught him before hitting the ground.  Patient is not on anticoagulation.  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  No family history on file.  Social History     Tobacco Use    Smoking status: Never    Smokeless tobacco: Never   Vaping Use    Vaping status: Never Used   Substance Use Topics    Alcohol use: Never    Drug use: Yes     Types: Marijuana       Physical Exam   ED Triage Vitals [03/15/25 1533]   Temperature Heart Rate Respirations BP   36.3 °C (97.3 °F) 64 18 118/67      Pulse Ox Temp Source Heart Rate Source Patient Position   100 % Tympanic Monitor Sitting      BP Location FiO2 (%)     Right arm --       Physical Exam  Vitals and nursing note  reviewed.   Constitutional:       General: He is not in acute distress.     Appearance: Normal appearance. He is not toxic-appearing.   HENT:      Head: Atraumatic.      Nose: Nose normal.      Mouth/Throat:      Mouth: Mucous membranes are moist.   Eyes:      Extraocular Movements: Extraocular movements intact.      Conjunctiva/sclera: Conjunctivae normal.      Pupils: Pupils are equal, round, and reactive to light.   Cardiovascular:      Rate and Rhythm: Regular rhythm. Bradycardia present.      Pulses: Normal pulses.      Heart sounds: Normal heart sounds.   Pulmonary:      Effort: Pulmonary effort is normal. No respiratory distress.      Breath sounds: Normal breath sounds. No stridor. No wheezing.   Abdominal:      General: Abdomen is flat. Bowel sounds are normal.      Palpations: Abdomen is soft.   Musculoskeletal:         General: No swelling or deformity. Normal range of motion.      Cervical back: Normal range of motion and neck supple. No rigidity.   Skin:     General: Skin is warm.      Capillary Refill: Capillary refill takes less than 2 seconds.   Neurological:      General: No focal deficit present.      Mental Status: He is alert and oriented to person, place, and time. Mental status is at baseline.      Cranial Nerves: No cranial nerve deficit.      Sensory: No sensory deficit.      Motor: No weakness.      Coordination: Coordination normal.      Gait: Gait normal.   Psychiatric:         Mood and Affect: Mood normal.         Behavior: Behavior normal.         Thought Content: Thought content normal.           ED Course & MDM   ED Course as of 03/15/25 1930   Sat Mar 15, 2025   1640 EKG completed.  Ventricular rate 56.  Sinus bradycardia.  No QRS widening no STEMI [AM]   1704 Troponin I, High Sensitivity: <3  Negative troponin [AM]   1813 Repeated trop is negative. CTA is pending [AM]   1858 Patient was reevaluated at bedside.  He is currently neurologically intact and states he is asymptomatic.   "Father is at bedside now.  Patient has no chest pain no abdominal pain no palpitations no lightheadedness no dizziness no near syncope symptoms.  Patient states \"I am feeling much better\".  Results regarding CBC CMP.,  Negative troponins x 2 CTA of the chest EKGs were reviewed and discussed with him and father at bedside.  Patient does have a cardiologist and he would like to be discharged and have outpatient follow-up.  All of his questions were answered.  Strict return precautions were provided. [AM]   1929 EKG with ventricular rate of 60.  Normal sinus rhythm normal EKG.  No axis deviation.  No trace widening.  No STEMI [AM]   1930 Patient is feeling well patient agree with discharge at this point. [AM]      ED Course User Index  [AM] Sigrid Esquivel MD         Diagnoses as of 03/15/25 1930   Syncope and collapse   Nausea and vomiting, unspecified vomiting type   Mariana user                 No data recorded     [place] Coma Scale Score: 15 (03/15/25 1538 : Jonnie Becker RN)                           Medical Decision Making      Procedure  Procedures     Sigrid Esquivel MD  03/15/25 1930    "

## 2025-03-17 ENCOUNTER — TELEPHONE (OUTPATIENT)
Dept: GASTROENTEROLOGY | Facility: HOSPITAL | Age: 25
End: 2025-03-17
Payer: COMMERCIAL

## 2025-03-17 NOTE — TELEPHONE ENCOUNTER
Gastric biopsies shows chronic gastritis and focal intestinal metaplasia patient to follow-up in the office I left message with the patient and noticed also he had ER visits recently.

## 2025-03-17 NOTE — TELEPHONE ENCOUNTER
Lab work reviewed predominantly direct hyperbilirubinemia most likely Gilbert's syndrome left message pathology reviewed to

## 2025-03-19 LAB
ATRIAL RATE: 56 BPM
ATRIAL RATE: 60 BPM
P AXIS: 66 DEGREES
P AXIS: 77 DEGREES
P OFFSET: 190 MS
P ONSET: 133 MS
PR INTERVAL: 168 MS
PR INTERVAL: 188 MS
Q ONSET: 217 MS
Q ONSET: 251 MS
QRS COUNT: 10 BEATS
QRS COUNT: 9 BEATS
QRS DURATION: 100 MS
QRS DURATION: 106 MS
QT INTERVAL: 418 MS
QT INTERVAL: 433 MS
QTC CALCULATION(BAZETT): 418 MS
QTC CALCULATION(BAZETT): 418 MS
QTC FREDERICIA: 418 MS
QTC FREDERICIA: 423 MS
R AXIS: 67 DEGREES
R AXIS: 81 DEGREES
T AXIS: 58 DEGREES
T AXIS: 76 DEGREES
T OFFSET: 426 MS
T OFFSET: 468 MS
VENTRICULAR RATE: 56 BPM
VENTRICULAR RATE: 60 BPM

## 2025-05-05 ENCOUNTER — APPOINTMENT (OUTPATIENT)
Dept: GASTROENTEROLOGY | Facility: CLINIC | Age: 25
End: 2025-05-05
Payer: COMMERCIAL

## 2025-05-05 VITALS
SYSTOLIC BLOOD PRESSURE: 121 MMHG | HEIGHT: 71 IN | BODY MASS INDEX: 25.81 KG/M2 | WEIGHT: 184.4 LBS | DIASTOLIC BLOOD PRESSURE: 73 MMHG | HEART RATE: 67 BPM

## 2025-05-05 DIAGNOSIS — K29.50 CHRONIC GASTRITIS WITHOUT BLEEDING, UNSPECIFIED GASTRITIS TYPE: ICD-10-CM

## 2025-05-05 DIAGNOSIS — E80.4 GILBERT'S SYNDROME: Primary | ICD-10-CM

## 2025-05-05 PROCEDURE — 1036F TOBACCO NON-USER: CPT | Performed by: INTERNAL MEDICINE

## 2025-05-05 PROCEDURE — 99213 OFFICE O/P EST LOW 20 MIN: CPT | Performed by: INTERNAL MEDICINE

## 2025-05-05 PROCEDURE — 3008F BODY MASS INDEX DOCD: CPT | Performed by: INTERNAL MEDICINE

## 2025-05-05 NOTE — PATIENT INSTRUCTIONS
As you remember your endoscopy and biopsy showed evidence of chronic gastritis.  I like to see you for a follow-up only in 1 year.  You may need repeat endoscopy in 2 to 3 years time

## 2025-05-05 NOTE — PROGRESS NOTES
This man was having abdominal pain and went to the emergency room was found to have a little elevated bilirubin.  On further evaluation this was thought to be from Gilbert's indirect hyperbilirubinemia.  He also having epigastric pain underwent upper endoscopy which showed evidence of gastritis and biopsies showed chronic intestinal metaplasia biopsies were negative for celiac sprue.    The patient is completely asymptomatic at this time he has normal bowel movements no nausea vomiting or abdominal pain.  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  He does smoke marijuana and does not smoke cigarettes or drink alcoholic beverages.    Family history personal history otherwise unchanged review of system otherwise completely unremarkable.  X 10    Physical examination shows he is not icteric today.  HEENT unremarkable no adenopathy oral cavity is normal neck is supple no nodes or enlarged were palpable.  Heart sounds were normal chest is clear abdominal exam is completely unremarkable no peripheral edema.    Clinical impression Gilbert's syndrome and was #2 chronic gastritis and intestinal metaplasia.    Recommendation I recommended he should have a repeat upper GI endoscopy in 2 to 3 years time.  He is quite knowledgeable Gilbert' syndrome.  Reassured.  Follow-up in 1 year

## 2025-05-21 ENCOUNTER — OFFICE VISIT (OUTPATIENT)
Dept: URGENT CARE | Age: 25
End: 2025-05-21
Payer: COMMERCIAL

## 2025-05-21 VITALS
TEMPERATURE: 97.9 F | DIASTOLIC BLOOD PRESSURE: 74 MMHG | HEART RATE: 70 BPM | RESPIRATION RATE: 14 BRPM | SYSTOLIC BLOOD PRESSURE: 117 MMHG | OXYGEN SATURATION: 98 %

## 2025-05-21 DIAGNOSIS — S60.469A INSECT BITE FINGER-INFECTED: Primary | ICD-10-CM

## 2025-05-21 DIAGNOSIS — L08.9 INSECT BITE FINGER-INFECTED: Primary | ICD-10-CM
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
## 2025-05-21 RX ORDER — DOXYCYCLINE 100 MG/1
100 CAPSULE ORAL 2 TIMES DAILY
Qty: 20 CAPSULE | Refills: 0 | Status: SHIPPED | OUTPATIENT
Start: 2025-05-21 | End: 2025-05-31

## 2025-05-21 ASSESSMENT — ENCOUNTER SYMPTOMS
WOUND: 1
FEVER: 0
HEADACHES: 0
FATIGUE: 0

## 2025-05-21 NOTE — PROGRESS NOTES
Subjective   Patient ID: Jonnie Jensen is a 24 y.o. male. They present today with a chief complaint of Insect Bite (On lower left leg X 4 days. MICHEL-MA).    History of Present Illness  Patien tpresents with concern for insect bite on left lower leg/ankle. States it aches, is swollen and red. Endorses anti-septic spray.           Past Medical History  Allergies as of 05/21/2025    (No Known Allergies)       Prescriptions Prior to Admission[1]     Medical History[2]    Surgical History[3]     reports that he has never smoked. He has never used smokeless tobacco. He reports current drug use. Drug: Marijuana. He reports that he does not drink alcohol.    Review of Systems  Review of Systems   Constitutional:  Negative for fatigue and fever.   Skin:  Positive for wound.   Neurological:  Negative for headaches.                                  Objective    Vitals:    05/21/25 1807   BP: 117/74   Pulse: 70   Resp: 14   Temp: 36.6 °C (97.9 °F)   SpO2: 98%     No LMP for male patient.    Physical Exam  Vitals reviewed.   Constitutional:       Appearance: Normal appearance.   Cardiovascular:      Rate and Rhythm: Normal rate.   Pulmonary:      Effort: Pulmonary effort is normal.   Skin:     General: Skin is warm and dry.      Findings: Lesion present.      Comments: Generalized erythema on left lateral ankle, pustule noted at the center. No lymphangitic streaking   Neurological:      Mental Status: He is alert.         Procedures    Point of Care Test & Imaging Results from this visit  No results found for this visit on 05/21/25.   Imaging  No results found.    Cardiology, Vascular, and Other Imaging  No other imaging results found for the past 2 days      Diagnostic study results (if any) were reviewed by JOHN Sanchez.    Assessment/Plan   Allergies, medications, history, and pertinent labs/EKGs/Imaging reviewed by JOHN Sanchez.     Medical Decision Making  Continue supportive measures, and symptom  management. Provided prescription for antibiotic. F/u if s/s increase or worsen.     At time of discharge patient was clinically well-appearing and HDS for outpatient management. The patient and/or family was educated regarding diagnosis, supportive care, OTC and Rx medications. The patient and/or family was given the opportunity to ask questions prior to discharge.  They verbalized understanding of my discussion of the plans for treatment, expected course, indications to return to  or seek further evaluation in ED, and the need for timely follow up as directed.   They were provided with a work/school excuse if requested.      Orders and Diagnoses  Diagnoses and all orders for this visit:  Insect bite finger-infected  -     doxycycline (Vibramycin) 100 mg capsule; Take 1 capsule (100 mg) by mouth 2 times a day for 10 days. 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 No pertinent surgical history.